# Patient Record
Sex: MALE | Race: WHITE | NOT HISPANIC OR LATINO | URBAN - METROPOLITAN AREA
[De-identification: names, ages, dates, MRNs, and addresses within clinical notes are randomized per-mention and may not be internally consistent; named-entity substitution may affect disease eponyms.]

---

## 2020-01-27 ENCOUNTER — HOSPITAL ENCOUNTER (EMERGENCY)
Facility: MEDICAL CENTER | Age: 30
End: 2020-01-27
Attending: EMERGENCY MEDICINE
Payer: COMMERCIAL

## 2020-01-27 VITALS
TEMPERATURE: 98.2 F | HEART RATE: 78 BPM | SYSTOLIC BLOOD PRESSURE: 138 MMHG | OXYGEN SATURATION: 98 % | RESPIRATION RATE: 16 BRPM | BODY MASS INDEX: 34.09 KG/M2 | WEIGHT: 238.1 LBS | HEIGHT: 70 IN | DIASTOLIC BLOOD PRESSURE: 74 MMHG

## 2020-01-27 DIAGNOSIS — Z76.0 MEDICATION REFILL: ICD-10-CM

## 2020-01-27 PROCEDURE — 99283 EMERGENCY DEPT VISIT LOW MDM: CPT

## 2020-01-27 RX ORDER — LISDEXAMFETAMINE DIMESYLATE CAPSULES 70 MG/1
70 CAPSULE ORAL EVERY MORNING
Qty: 9 CAP | Refills: 0 | Status: SHIPPED | OUTPATIENT
Start: 2020-01-27 | End: 2020-02-05

## 2020-01-27 RX ORDER — LISDEXAMFETAMINE DIMESYLATE CAPSULES 70 MG/1
70 CAPSULE ORAL EVERY MORNING
COMMUNITY
End: 2020-01-27 | Stop reason: SDUPTHER

## 2020-01-28 NOTE — ED TRIAGE NOTES
Chief Complaint   Patient presents with   • Medication Refill     Ambulatory to triage for above. Takes vyvanse 70mg qd and receives prescriptions from his physician to fill each month x3 months. He attempted to fill at pharmacy today but they were unable. Explained triage process, to waiting room. Asked to inform RN if questions or concerns arise.

## 2020-01-28 NOTE — ED PROVIDER NOTES
ED Provider Note    CHIEF COMPLAINT   Chief Complaint   Patient presents with   • Medication Refill       HPI   Atul Sams is a 29 y.o. male who presents for med refill.  Patient is here with a request for Vyvanse 70 mg tablets.  He recommended 1 tablet every day  Patient states that he has a prescription here with a prescription is given has watermark is appropriate says do not to be filled before 1/27/2020.  He states that he was last seen there on 730 is been 181 days since patient was last seen at the office and out[place]of[place] prescriptions we will not fill it out if he has not been seen within 180 days especially for controlled substance.  He is asking for the medication to be taken at least until Monday till seen by the doctor.  He is here to Friday for work.    REVIEW OF SYSTEMS   See HPI for further details. All other systems are negative.     PAST MEDICAL HISTORY   ADHD  FAMILY HISTORY  History reviewed. No pertinent family history.    SOCIAL HISTORY  Social History     Socioeconomic History   • Marital status: Not on file     Spouse name: Not on file   • Number of children: Not on file   • Years of education: Not on file   • Highest education level: Not on file   Occupational History   • Not on file   Social Needs   • Financial resource strain: Not on file   • Food insecurity:     Worry: Not on file     Inability: Not on file   • Transportation needs:     Medical: Not on file     Non-medical: Not on file   Tobacco Use   • Smoking status: Never Smoker   • Smokeless tobacco: Never Used   Substance and Sexual Activity   • Alcohol use: Yes   • Drug use: Never   • Sexual activity: Not on file   Lifestyle   • Physical activity:     Days per week: Not on file     Minutes per session: Not on file   • Stress: Not on file   Relationships   • Social connections:     Talks on phone: Not on file     Gets together: Not on file     Attends [employer] service: Not on file     Active member of club or organization:  "Not on file     Attends meetings of clubs or organizations: Not on file     Relationship status: Not on file   • Intimate partner violence:     Fear of current or ex partner: Not on file     Emotionally abused: Not on file     Physically abused: Not on file     Forced sexual activity: Not on file   Other Topics Concern   • Not on file   Social History Narrative   • Not on file        SURGICAL HISTORY  No past surgical history on file.    CURRENT MEDICATIONS   Home Medications     Reviewed by Jayden Jon R.N. (Registered Nurse) on 01/27/20 at 1724  Med List Status: Complete   Medication Last Dose Status   lisdexamfetamine (VYVANSE) 70 MG capsule  Active                ALLERGIES   No Known Allergies    PHYSICAL EXAM  VITAL SIGNS: /106   Pulse 83   Temp 36.8 °C (98.2 °F) (Temporal)   Resp 16   Ht 1.778 m (5' 10\")   Wt 108 kg (238 lb 1.6 oz)   SpO2 97%   BMI 34.16 kg/m²       Constitutional: Well developed, Well nourished, No acute distress, Non-toxic appearance.   Cardiovascular: Extremities warm to touch  Thorax & Lungs: Respiratory distress  Psychiatric: Well-appearing not anxious no pressured speech      COURSE & MEDICAL DECISION MAKING  Pertinent Labs & Imaging studies reviewed. (See chart for details)  Medication refill.  At this point gone on it for 10 days narcotic search was done the patient has no narcotics as before he is given me a prescription that actually appears appropriate.  At this point we called his doctor's office and left a message with the help with all call back we will explain the least that he will follow-up in the office on Monday.  At this point my suspicion for patient observed abusing prescription medication is low we will go and prescribe him 4 weeks worth.  Patient be discharged home    FINAL IMPRESSION  1.  Medication refill  2.   3.      Electronically signed by: Lance Maki M.D., 1/27/2020 6:04 PM    "